# Patient Record
Sex: MALE | Race: WHITE | Employment: OTHER | URBAN - METROPOLITAN AREA
[De-identification: names, ages, dates, MRNs, and addresses within clinical notes are randomized per-mention and may not be internally consistent; named-entity substitution may affect disease eponyms.]

---

## 2017-08-25 PROBLEM — S32.000A CLOSED COMPRESSION FRACTURE OF LUMBAR VERTEBRA (HCC): Status: ACTIVE | Noted: 2017-08-25

## 2018-08-02 ENCOUNTER — HOSPITAL ENCOUNTER (OUTPATIENT)
Dept: GENERAL RADIOLOGY | Age: 78
Discharge: HOME | End: 2018-08-04
Payer: MEDICARE

## 2018-08-02 ENCOUNTER — HOSPITAL ENCOUNTER (OUTPATIENT)
Age: 78
Discharge: HOME | End: 2018-08-04
Payer: MEDICARE

## 2018-08-02 DIAGNOSIS — M79.642 PAIN IN LEFT HAND: ICD-10-CM

## 2018-08-02 PROCEDURE — 73130 X-RAY EXAM OF HAND: CPT

## 2019-10-20 ENCOUNTER — HOSPITAL ENCOUNTER (EMERGENCY)
Age: 79
Discharge: HOME | End: 2019-10-21
Attending: EMERGENCY MEDICINE
Payer: MEDICARE

## 2019-10-20 ENCOUNTER — APPOINTMENT (OUTPATIENT)
Dept: CT IMAGING | Age: 79
End: 2019-10-20
Payer: MEDICARE

## 2019-10-20 DIAGNOSIS — S06.6X0A SUBARACHNOID HEMORRHAGE FOLLOWING INJURY, NO LOSS OF CONSCIOUSNESS, INITIAL ENCOUNTER (HCC): ICD-10-CM

## 2019-10-20 DIAGNOSIS — S09.90XA INJURY OF HEAD, INITIAL ENCOUNTER: Primary | ICD-10-CM

## 2019-10-20 PROCEDURE — 70450 CT HEAD/BRAIN W/O DYE: CPT

## 2019-10-20 PROCEDURE — 72125 CT NECK SPINE W/O DYE: CPT

## 2019-10-20 PROCEDURE — 99284 EMERGENCY DEPT VISIT MOD MDM: CPT

## 2019-10-20 ASSESSMENT — PAIN DESCRIPTION - PAIN TYPE: TYPE: ACUTE PAIN

## 2019-10-20 ASSESSMENT — PAIN SCALES - GENERAL: PAINLEVEL_OUTOF10: 8

## 2019-10-20 ASSESSMENT — PAIN DESCRIPTION - LOCATION: LOCATION: HEAD

## 2019-10-20 ASSESSMENT — PAIN DESCRIPTION - DESCRIPTORS: DESCRIPTORS: ACHING

## 2019-10-21 ENCOUNTER — HOSPITAL ENCOUNTER (INPATIENT)
Age: 79
LOS: 3 days | Discharge: NURSING, REHAB OR LONG-TERM CARE | DRG: 083 | End: 2019-10-24
Attending: EMERGENCY MEDICINE | Admitting: STUDENT IN AN ORGANIZED HEALTH CARE EDUCATION/TRAINING PROGRAM
Payer: MEDICARE

## 2019-10-21 ENCOUNTER — APPOINTMENT (OUTPATIENT)
Dept: CT IMAGING | Age: 79
DRG: 083 | End: 2019-10-21
Payer: MEDICARE

## 2019-10-21 ENCOUNTER — HOSPITAL ENCOUNTER (OUTPATIENT)
Age: 79
Discharge: HOME | End: 2019-10-21
Payer: MEDICARE

## 2019-10-21 ENCOUNTER — APPOINTMENT (OUTPATIENT)
Dept: GENERAL RADIOLOGY | Age: 79
DRG: 083 | End: 2019-10-21
Payer: MEDICARE

## 2019-10-21 ENCOUNTER — APPOINTMENT (OUTPATIENT)
Dept: GENERAL RADIOLOGY | Age: 79
End: 2019-10-21
Payer: MEDICARE

## 2019-10-21 VITALS
HEART RATE: 64 BPM | DIASTOLIC BLOOD PRESSURE: 60 MMHG | TEMPERATURE: 98.5 F | HEIGHT: 68 IN | OXYGEN SATURATION: 97 % | SYSTOLIC BLOOD PRESSURE: 131 MMHG | RESPIRATION RATE: 18 BRPM | WEIGHT: 220 LBS | BODY MASS INDEX: 33.34 KG/M2

## 2019-10-21 DIAGNOSIS — I60.9 SUBARACHNOID HEMORRHAGE (HCC): ICD-10-CM

## 2019-10-21 DIAGNOSIS — S09.90XA INJURY OF HEAD, INITIAL ENCOUNTER: Primary | ICD-10-CM

## 2019-10-21 PROBLEM — T14.90XA TRAUMA: Status: ACTIVE | Noted: 2019-10-21

## 2019-10-21 LAB
ANION GAP SERPL CALCULATED.3IONS-SCNC: 12 MMOL/L (ref 7–16)
ANION GAP SERPL CALCULATED.3IONS-SCNC: 12 MMOL/L (ref 7–16)
APTT: 34.9 SEC (ref 24.5–35.1)
BASOPHILS ABSOLUTE: 0.05 E9/L (ref 0–0.2)
BASOPHILS RELATIVE PERCENT: 0.5 % (ref 0–2)
BUN BLDV-MCNC: 11 MG/DL (ref 8–23)
BUN BLDV-MCNC: 14 MG/DL (ref 8–23)
CALCIUM SERPL-MCNC: 8.7 MG/DL (ref 8.6–10.2)
CALCIUM SERPL-MCNC: 9.1 MG/DL (ref 8.6–10.2)
CHLORIDE BLD-SCNC: 100 MMOL/L (ref 98–107)
CHLORIDE BLD-SCNC: 100 MMOL/L (ref 98–107)
CO2: 27 MMOL/L (ref 22–29)
CO2: 27 MMOL/L (ref 22–29)
CREAT SERPL-MCNC: 0.6 MG/DL (ref 0.7–1.2)
CREAT SERPL-MCNC: 0.6 MG/DL (ref 0.7–1.2)
EOSINOPHILS ABSOLUTE: 0.41 E9/L (ref 0.05–0.5)
EOSINOPHILS RELATIVE PERCENT: 3.9 % (ref 0–6)
GFR AFRICAN AMERICAN: >60
GFR AFRICAN AMERICAN: >60
GFR NON-AFRICAN AMERICAN: >60 ML/MIN/1.73
GFR NON-AFRICAN AMERICAN: >60 ML/MIN/1.73
GLUCOSE BLD-MCNC: 122 MG/DL (ref 74–99)
GLUCOSE BLD-MCNC: 89 MG/DL (ref 74–99)
HCT VFR BLD CALC: 37.6 % (ref 37–54)
HEMOGLOBIN: 11.8 G/DL (ref 12.5–16.5)
IMMATURE GRANULOCYTES #: 0.03 E9/L
IMMATURE GRANULOCYTES %: 0.3 % (ref 0–5)
INR BLD: 0.9
LACTIC ACID: 0.8 MMOL/L (ref 0.5–2.2)
LYMPHOCYTES ABSOLUTE: 1.34 E9/L (ref 1.5–4)
LYMPHOCYTES RELATIVE PERCENT: 12.7 % (ref 20–42)
MAGNESIUM: 2.1 MG/DL (ref 1.6–2.6)
MCH RBC QN AUTO: 27.3 PG (ref 26–35)
MCHC RBC AUTO-ENTMCNC: 31.4 % (ref 32–34.5)
MCV RBC AUTO: 87 FL (ref 80–99.9)
MONOCYTES ABSOLUTE: 0.75 E9/L (ref 0.1–0.95)
MONOCYTES RELATIVE PERCENT: 7.1 % (ref 2–12)
NEUTROPHILS ABSOLUTE: 7.97 E9/L (ref 1.8–7.3)
NEUTROPHILS RELATIVE PERCENT: 75.5 % (ref 43–80)
PDW BLD-RTO: 14.8 FL (ref 11.5–15)
PLATELET # BLD: 252 E9/L (ref 130–450)
PMV BLD AUTO: 11 FL (ref 7–12)
POTASSIUM REFLEX MAGNESIUM: 3.7 MMOL/L (ref 3.5–5)
POTASSIUM SERPL-SCNC: 4.1 MMOL/L (ref 3.5–5)
PROTHROMBIN TIME: 10.9 SEC (ref 9.3–12.4)
RBC # BLD: 4.32 E12/L (ref 3.8–5.8)
SODIUM BLD-SCNC: 139 MMOL/L (ref 132–146)
SODIUM BLD-SCNC: 139 MMOL/L (ref 132–146)
TROPONIN: <0.01 NG/ML (ref 0–0.03)
WBC # BLD: 10.6 E9/L (ref 4.5–11.5)

## 2019-10-21 PROCEDURE — 97162 PT EVAL MOD COMPLEX 30 MIN: CPT

## 2019-10-21 PROCEDURE — 93005 ELECTROCARDIOGRAM TRACING: CPT | Performed by: NURSE PRACTITIONER

## 2019-10-21 PROCEDURE — 85610 PROTHROMBIN TIME: CPT

## 2019-10-21 PROCEDURE — 97530 THERAPEUTIC ACTIVITIES: CPT

## 2019-10-21 PROCEDURE — 36415 COLL VENOUS BLD VENIPUNCTURE: CPT

## 2019-10-21 PROCEDURE — 2580000003 HC RX 258: Performed by: STUDENT IN AN ORGANIZED HEALTH CARE EDUCATION/TRAINING PROGRAM

## 2019-10-21 PROCEDURE — A0425 GROUND MILEAGE: HCPCS

## 2019-10-21 PROCEDURE — A0426 ALS 1: HCPCS

## 2019-10-21 PROCEDURE — 84484 ASSAY OF TROPONIN QUANT: CPT

## 2019-10-21 PROCEDURE — 99223 1ST HOSP IP/OBS HIGH 75: CPT | Performed by: SURGERY

## 2019-10-21 PROCEDURE — 99221 1ST HOSP IP/OBS SF/LOW 40: CPT | Performed by: NEUROLOGICAL SURGERY

## 2019-10-21 PROCEDURE — 2140000000 HC CCU INTERMEDIATE R&B

## 2019-10-21 PROCEDURE — 85730 THROMBOPLASTIN TIME PARTIAL: CPT

## 2019-10-21 PROCEDURE — 80048 BASIC METABOLIC PNL TOTAL CA: CPT

## 2019-10-21 PROCEDURE — 72170 X-RAY EXAM OF PELVIS: CPT

## 2019-10-21 PROCEDURE — 6370000000 HC RX 637 (ALT 250 FOR IP): Performed by: STUDENT IN AN ORGANIZED HEALTH CARE EDUCATION/TRAINING PROGRAM

## 2019-10-21 PROCEDURE — 70450 CT HEAD/BRAIN W/O DYE: CPT

## 2019-10-21 PROCEDURE — 99285 EMERGENCY DEPT VISIT HI MDM: CPT

## 2019-10-21 PROCEDURE — 6360000002 HC RX W HCPCS

## 2019-10-21 PROCEDURE — 6370000000 HC RX 637 (ALT 250 FOR IP): Performed by: NURSE PRACTITIONER

## 2019-10-21 PROCEDURE — 97535 SELF CARE MNGMENT TRAINING: CPT

## 2019-10-21 PROCEDURE — 85025 COMPLETE CBC W/AUTO DIFF WBC: CPT

## 2019-10-21 PROCEDURE — 6370000000 HC RX 637 (ALT 250 FOR IP): Performed by: SURGERY

## 2019-10-21 PROCEDURE — 83605 ASSAY OF LACTIC ACID: CPT

## 2019-10-21 PROCEDURE — 83735 ASSAY OF MAGNESIUM: CPT

## 2019-10-21 PROCEDURE — 97166 OT EVAL MOD COMPLEX 45 MIN: CPT

## 2019-10-21 PROCEDURE — 71045 X-RAY EXAM CHEST 1 VIEW: CPT

## 2019-10-21 RX ORDER — LORATADINE 10 MG/1
10 CAPSULE, LIQUID FILLED ORAL DAILY PRN
COMMUNITY

## 2019-10-21 RX ORDER — TRAMADOL HYDROCHLORIDE 50 MG/1
50 TABLET ORAL ONCE
Status: COMPLETED | OUTPATIENT
Start: 2019-10-21 | End: 2019-10-21

## 2019-10-21 RX ORDER — SENNA AND DOCUSATE SODIUM 50; 8.6 MG/1; MG/1
1 TABLET, FILM COATED ORAL 2 TIMES DAILY
Status: DISCONTINUED | OUTPATIENT
Start: 2019-10-21 | End: 2019-10-24 | Stop reason: HOSPADM

## 2019-10-21 RX ORDER — HALOPERIDOL 5 MG/ML
INJECTION INTRAMUSCULAR
Status: COMPLETED
Start: 2019-10-21 | End: 2019-10-21

## 2019-10-21 RX ORDER — SODIUM CHLORIDE 0.9 % (FLUSH) 0.9 %
10 SYRINGE (ML) INJECTION PRN
Status: DISCONTINUED | OUTPATIENT
Start: 2019-10-21 | End: 2019-10-24 | Stop reason: HOSPADM

## 2019-10-21 RX ORDER — HYDROCODONE BITARTRATE AND ACETAMINOPHEN 5; 325 MG/1; MG/1
1 TABLET ORAL EVERY 4 HOURS PRN
Status: DISCONTINUED | OUTPATIENT
Start: 2019-10-21 | End: 2019-10-24 | Stop reason: HOSPADM

## 2019-10-21 RX ORDER — QUETIAPINE FUMARATE 300 MG/1
300 TABLET, FILM COATED ORAL DAILY
Status: ON HOLD | COMMUNITY
End: 2020-08-03

## 2019-10-21 RX ORDER — HYDROCODONE BITARTRATE AND ACETAMINOPHEN 5; 325 MG/1; MG/1
2 TABLET ORAL EVERY 4 HOURS PRN
Status: DISCONTINUED | OUTPATIENT
Start: 2019-10-21 | End: 2019-10-24 | Stop reason: HOSPADM

## 2019-10-21 RX ORDER — DONEPEZIL HYDROCHLORIDE 5 MG/1
5 TABLET, FILM COATED ORAL NIGHTLY
Status: DISCONTINUED | OUTPATIENT
Start: 2019-10-21 | End: 2019-10-24 | Stop reason: HOSPADM

## 2019-10-21 RX ORDER — ACETAMINOPHEN 500 MG
500 TABLET ORAL EVERY 6 HOURS SCHEDULED
Status: DISCONTINUED | OUTPATIENT
Start: 2019-10-21 | End: 2019-10-24 | Stop reason: HOSPADM

## 2019-10-21 RX ORDER — ONDANSETRON 2 MG/ML
4 INJECTION INTRAMUSCULAR; INTRAVENOUS EVERY 6 HOURS PRN
Status: DISCONTINUED | OUTPATIENT
Start: 2019-10-21 | End: 2019-10-24 | Stop reason: HOSPADM

## 2019-10-21 RX ORDER — QUETIAPINE FUMARATE 300 MG/1
300 TABLET, FILM COATED ORAL DAILY
Status: DISCONTINUED | OUTPATIENT
Start: 2019-10-21 | End: 2019-10-24 | Stop reason: HOSPADM

## 2019-10-21 RX ORDER — MEMANTINE HYDROCHLORIDE 10 MG/1
10 TABLET ORAL 2 TIMES DAILY
Status: DISCONTINUED | OUTPATIENT
Start: 2019-10-21 | End: 2019-10-24 | Stop reason: HOSPADM

## 2019-10-21 RX ORDER — BACITRACIN, NEOMYCIN, POLYMYXIN B 400; 3.5; 5 [USP'U]/G; MG/G; [USP'U]/G
OINTMENT TOPICAL DAILY
Status: DISCONTINUED | OUTPATIENT
Start: 2019-10-21 | End: 2019-10-24 | Stop reason: HOSPADM

## 2019-10-21 RX ORDER — LEVETIRACETAM 500 MG/1
500 TABLET ORAL 2 TIMES DAILY
Status: DISCONTINUED | OUTPATIENT
Start: 2019-10-21 | End: 2019-10-23 | Stop reason: SDUPTHER

## 2019-10-21 RX ORDER — DIAPER,BRIEF,INFANT-TODD,DISP
EACH MISCELLANEOUS ONCE
Status: COMPLETED | OUTPATIENT
Start: 2019-10-21 | End: 2019-10-21

## 2019-10-21 RX ORDER — SODIUM CHLORIDE 0.9 % (FLUSH) 0.9 %
10 SYRINGE (ML) INJECTION EVERY 12 HOURS SCHEDULED
Status: DISCONTINUED | OUTPATIENT
Start: 2019-10-21 | End: 2019-10-24 | Stop reason: HOSPADM

## 2019-10-21 RX ADMIN — BACITRACIN ZINC 1 G: 500 OINTMENT TOPICAL at 00:17

## 2019-10-21 RX ADMIN — ACETAMINOPHEN 500 MG: 500 TABLET ORAL at 16:38

## 2019-10-21 RX ADMIN — Medication 10 ML: at 21:36

## 2019-10-21 RX ADMIN — HALOPERIDOL LACTATE 5 MG: 5 INJECTION INTRAMUSCULAR at 07:09

## 2019-10-21 RX ADMIN — DONEPEZIL HYDROCHLORIDE 5 MG: 5 TABLET, FILM COATED ORAL at 21:33

## 2019-10-21 RX ADMIN — MEMANTINE HYDROCHLORIDE 10 MG: 10 TABLET, FILM COATED ORAL at 14:05

## 2019-10-21 RX ADMIN — MEMANTINE HYDROCHLORIDE 10 MG: 10 TABLET, FILM COATED ORAL at 21:32

## 2019-10-21 RX ADMIN — BACITRACIN ZINC, POLYMYXIN B SULFATE, NEOMYCIN SULFATE: 400; 5000; 3.5 OINTMENT TOPICAL at 14:05

## 2019-10-21 RX ADMIN — LEVETIRACETAM 500 MG: 500 TABLET ORAL at 21:32

## 2019-10-21 RX ADMIN — SENNOSIDES, DOCUSATE SODIUM 1 TABLET: 50; 8.6 TABLET, FILM COATED ORAL at 21:32

## 2019-10-21 RX ADMIN — TRAMADOL HYDROCHLORIDE 50 MG: 50 TABLET ORAL at 00:17

## 2019-10-21 RX ADMIN — LEVETIRACETAM 500 MG: 500 TABLET ORAL at 05:09

## 2019-10-21 RX ADMIN — QUETIAPINE FUMARATE 300 MG: 300 TABLET ORAL at 21:33

## 2019-10-21 ASSESSMENT — PAIN SCALES - GENERAL
PAINLEVEL_OUTOF10: 3
PAINLEVEL_OUTOF10: 0

## 2019-10-22 LAB
EKG ATRIAL RATE: 69 BPM
EKG P AXIS: 115 DEGREES
EKG P-R INTERVAL: 152 MS
EKG Q-T INTERVAL: 464 MS
EKG QRS DURATION: 128 MS
EKG QTC CALCULATION (BAZETT): 497 MS
EKG R AXIS: 18 DEGREES
EKG T AXIS: 30 DEGREES
EKG VENTRICULAR RATE: 69 BPM

## 2019-10-22 PROCEDURE — 2140000000 HC CCU INTERMEDIATE R&B

## 2019-10-22 PROCEDURE — 93010 ELECTROCARDIOGRAM REPORT: CPT | Performed by: INTERNAL MEDICINE

## 2019-10-22 PROCEDURE — 6370000000 HC RX 637 (ALT 250 FOR IP): Performed by: SURGERY

## 2019-10-22 PROCEDURE — 6370000000 HC RX 637 (ALT 250 FOR IP): Performed by: STUDENT IN AN ORGANIZED HEALTH CARE EDUCATION/TRAINING PROGRAM

## 2019-10-22 PROCEDURE — 99221 1ST HOSP IP/OBS SF/LOW 40: CPT | Performed by: EMERGENCY MEDICINE

## 2019-10-22 PROCEDURE — 2580000003 HC RX 258: Performed by: STUDENT IN AN ORGANIZED HEALTH CARE EDUCATION/TRAINING PROGRAM

## 2019-10-22 PROCEDURE — 99232 SBSQ HOSP IP/OBS MODERATE 35: CPT | Performed by: NEUROLOGICAL SURGERY

## 2019-10-22 PROCEDURE — 99232 SBSQ HOSP IP/OBS MODERATE 35: CPT | Performed by: SURGERY

## 2019-10-22 RX ADMIN — SENNOSIDES, DOCUSATE SODIUM 1 TABLET: 50; 8.6 TABLET, FILM COATED ORAL at 20:17

## 2019-10-22 RX ADMIN — LEVETIRACETAM 500 MG: 500 TABLET ORAL at 20:17

## 2019-10-22 RX ADMIN — DONEPEZIL HYDROCHLORIDE 5 MG: 5 TABLET, FILM COATED ORAL at 20:17

## 2019-10-22 RX ADMIN — Medication 10 ML: at 20:25

## 2019-10-22 RX ADMIN — MEMANTINE HYDROCHLORIDE 10 MG: 10 TABLET, FILM COATED ORAL at 20:17

## 2019-10-22 ASSESSMENT — PAIN SCALES - GENERAL
PAINLEVEL_OUTOF10: 0

## 2019-10-22 ASSESSMENT — PAIN SCALES - WONG BAKER
WONGBAKER_NUMERICALRESPONSE: 0

## 2019-10-23 PROCEDURE — 6370000000 HC RX 637 (ALT 250 FOR IP): Performed by: SURGERY

## 2019-10-23 PROCEDURE — 6370000000 HC RX 637 (ALT 250 FOR IP): Performed by: STUDENT IN AN ORGANIZED HEALTH CARE EDUCATION/TRAINING PROGRAM

## 2019-10-23 PROCEDURE — 99232 SBSQ HOSP IP/OBS MODERATE 35: CPT | Performed by: NEUROLOGICAL SURGERY

## 2019-10-23 PROCEDURE — 1200000000 HC SEMI PRIVATE

## 2019-10-23 PROCEDURE — 99232 SBSQ HOSP IP/OBS MODERATE 35: CPT | Performed by: SURGERY

## 2019-10-23 RX ORDER — LEVETIRACETAM 100 MG/ML
500 SOLUTION ORAL 2 TIMES DAILY
Status: DISCONTINUED | OUTPATIENT
Start: 2019-10-23 | End: 2019-10-24 | Stop reason: HOSPADM

## 2019-10-23 RX ADMIN — BACITRACIN ZINC, POLYMYXIN B SULFATE, NEOMYCIN SULFATE: 400; 5000; 3.5 OINTMENT TOPICAL at 09:18

## 2019-10-23 RX ADMIN — LEVETIRACETAM 500 MG: 100 SOLUTION ORAL at 21:08

## 2019-10-23 RX ADMIN — LEVETIRACETAM 500 MG: 500 TABLET ORAL at 09:18

## 2019-10-23 ASSESSMENT — PAIN SCALES - GENERAL: PAINLEVEL_OUTOF10: 0

## 2019-10-23 ASSESSMENT — PAIN SCALES - WONG BAKER
WONGBAKER_NUMERICALRESPONSE: 0
WONGBAKER_NUMERICALRESPONSE: 0

## 2019-10-24 VITALS
BODY MASS INDEX: 33.34 KG/M2 | HEIGHT: 68 IN | DIASTOLIC BLOOD PRESSURE: 62 MMHG | TEMPERATURE: 98.3 F | HEART RATE: 80 BPM | RESPIRATION RATE: 18 BRPM | OXYGEN SATURATION: 97 % | SYSTOLIC BLOOD PRESSURE: 124 MMHG | WEIGHT: 220 LBS

## 2019-10-24 PROCEDURE — 6370000000 HC RX 637 (ALT 250 FOR IP): Performed by: SURGERY

## 2019-10-24 PROCEDURE — APPSS60 APP SPLIT SHARED TIME 46-60 MINUTES: Performed by: NURSE PRACTITIONER

## 2019-10-24 PROCEDURE — 99238 HOSP IP/OBS DSCHRG MGMT 30/<: CPT | Performed by: SURGERY

## 2019-10-24 PROCEDURE — 2580000003 HC RX 258: Performed by: STUDENT IN AN ORGANIZED HEALTH CARE EDUCATION/TRAINING PROGRAM

## 2019-10-24 PROCEDURE — 6370000000 HC RX 637 (ALT 250 FOR IP): Performed by: STUDENT IN AN ORGANIZED HEALTH CARE EDUCATION/TRAINING PROGRAM

## 2019-10-24 RX ORDER — LEVETIRACETAM 100 MG/ML
500 SOLUTION ORAL 2 TIMES DAILY
Qty: 50 ML | Refills: 0 | Status: SHIPPED | OUTPATIENT
Start: 2019-10-24 | End: 2019-10-29

## 2019-10-24 RX ORDER — LEVETIRACETAM 100 MG/ML
500 SOLUTION ORAL 2 TIMES DAILY
Qty: 50 ML | Refills: 0 | Status: SHIPPED | OUTPATIENT
Start: 2019-10-24 | End: 2019-10-24

## 2019-10-24 RX ADMIN — QUETIAPINE FUMARATE 300 MG: 300 TABLET ORAL at 09:57

## 2019-10-24 RX ADMIN — MEMANTINE HYDROCHLORIDE 10 MG: 10 TABLET, FILM COATED ORAL at 09:58

## 2019-10-24 RX ADMIN — ACETAMINOPHEN 500 MG: 500 TABLET ORAL at 11:30

## 2019-10-24 RX ADMIN — LEVETIRACETAM 500 MG: 100 SOLUTION ORAL at 09:58

## 2019-10-24 RX ADMIN — SENNOSIDES, DOCUSATE SODIUM 1 TABLET: 50; 8.6 TABLET, FILM COATED ORAL at 09:58

## 2019-10-24 RX ADMIN — BACITRACIN ZINC, POLYMYXIN B SULFATE, NEOMYCIN SULFATE: 400; 5000; 3.5 OINTMENT TOPICAL at 09:59

## 2019-10-24 ASSESSMENT — PAIN SCALES - WONG BAKER
WONGBAKER_NUMERICALRESPONSE: 0
WONGBAKER_NUMERICALRESPONSE: 0

## 2019-11-05 DIAGNOSIS — I60.9 SUBARACHNOID HEMORRHAGE (HCC): Primary | ICD-10-CM

## 2020-08-03 ENCOUNTER — HOSPITAL ENCOUNTER (INPATIENT)
Age: 80
LOS: 1 days | Discharge: NURSING, REHAB OR LONG-TERM CARE | DRG: 194 | End: 2020-08-04
Attending: EMERGENCY MEDICINE | Admitting: INTERNAL MEDICINE
Payer: MEDICARE

## 2020-08-03 ENCOUNTER — APPOINTMENT (OUTPATIENT)
Dept: GENERAL RADIOLOGY | Age: 80
DRG: 194 | End: 2020-08-03
Payer: MEDICARE

## 2020-08-03 PROBLEM — J18.9 PNEUMONIA: Status: ACTIVE | Noted: 2020-08-03

## 2020-08-03 LAB
ALBUMIN SERPL-MCNC: 3.9 G/DL (ref 3.5–5.2)
ALP BLD-CCNC: 64 U/L (ref 40–129)
ALT SERPL-CCNC: 21 U/L (ref 0–40)
ANION GAP SERPL CALCULATED.3IONS-SCNC: 16 MMOL/L (ref 7–16)
AST SERPL-CCNC: 39 U/L (ref 0–39)
BACTERIA: ABNORMAL /HPF
BASOPHILS ABSOLUTE: 0.01 E9/L (ref 0–0.2)
BASOPHILS RELATIVE PERCENT: 0.2 % (ref 0–2)
BILIRUB SERPL-MCNC: 0.5 MG/DL (ref 0–1.2)
BILIRUBIN DIRECT: 0.2 MG/DL (ref 0–0.3)
BILIRUBIN URINE: ABNORMAL
BILIRUBIN, INDIRECT: 0.3 MG/DL (ref 0–1)
BLOOD, URINE: ABNORMAL
BUN BLDV-MCNC: 17 MG/DL (ref 8–23)
CALCIUM SERPL-MCNC: 8.9 MG/DL (ref 8.6–10.2)
CHLORIDE BLD-SCNC: 95 MMOL/L (ref 98–107)
CHP ED QC CHECK: NORMAL
CLARITY: CLEAR
CO2: 26 MMOL/L (ref 22–29)
COLOR: ABNORMAL
CREAT SERPL-MCNC: 0.5 MG/DL (ref 0.7–1.2)
EOSINOPHILS ABSOLUTE: 0.01 E9/L (ref 0.05–0.5)
EOSINOPHILS RELATIVE PERCENT: 0.2 % (ref 0–6)
GFR AFRICAN AMERICAN: >60
GFR NON-AFRICAN AMERICAN: >60 ML/MIN/1.73
GLUCOSE BLD-MCNC: 100 MG/DL (ref 74–99)
GLUCOSE BLD-MCNC: 108 MG/DL
GLUCOSE URINE: NEGATIVE MG/DL
HCT VFR BLD CALC: 47.5 % (ref 37–54)
HEMOGLOBIN: 15.7 G/DL (ref 12.5–16.5)
IMMATURE GRANULOCYTES #: 0.02 E9/L
IMMATURE GRANULOCYTES %: 0.4 % (ref 0–5)
KETONES, URINE: 40 MG/DL
LACTIC ACID, SEPSIS: 3.4 MMOL/L (ref 0.5–1.9)
LEUKOCYTE ESTERASE, URINE: NEGATIVE
LYMPHOCYTES ABSOLUTE: 0.85 E9/L (ref 1.5–4)
LYMPHOCYTES RELATIVE PERCENT: 15.9 % (ref 20–42)
MCH RBC QN AUTO: 31.3 PG (ref 26–35)
MCHC RBC AUTO-ENTMCNC: 33.1 % (ref 32–34.5)
MCV RBC AUTO: 94.6 FL (ref 80–99.9)
METER GLUCOSE: 107 MG/DL (ref 74–99)
MONOCYTES ABSOLUTE: 0.62 E9/L (ref 0.1–0.95)
MONOCYTES RELATIVE PERCENT: 11.6 % (ref 2–12)
NEUTROPHILS ABSOLUTE: 3.82 E9/L (ref 1.8–7.3)
NEUTROPHILS RELATIVE PERCENT: 71.7 % (ref 43–80)
NITRITE, URINE: NEGATIVE
PDW BLD-RTO: 13.9 FL (ref 11.5–15)
PH UA: 7 (ref 5–9)
PLATELET # BLD: 209 E9/L (ref 130–450)
PMV BLD AUTO: 10.7 FL (ref 7–12)
POTASSIUM REFLEX MAGNESIUM: 3.8 MMOL/L (ref 3.5–5)
PRO-BNP: 355 PG/ML (ref 0–450)
PROTEIN UA: ABNORMAL MG/DL
RBC # BLD: 5.02 E12/L (ref 3.8–5.8)
RBC UA: ABNORMAL /HPF (ref 0–2)
SARS-COV-2, NAAT: NOT DETECTED
SODIUM BLD-SCNC: 137 MMOL/L (ref 132–146)
SPECIFIC GRAVITY UA: 1.01 (ref 1–1.03)
TOTAL PROTEIN: 7.3 G/DL (ref 6.4–8.3)
UROBILINOGEN, URINE: 1 E.U./DL
WBC # BLD: 5.3 E9/L (ref 4.5–11.5)
WBC UA: ABNORMAL /HPF (ref 0–5)

## 2020-08-03 PROCEDURE — 87040 BLOOD CULTURE FOR BACTERIA: CPT

## 2020-08-03 PROCEDURE — 99285 EMERGENCY DEPT VISIT HI MDM: CPT

## 2020-08-03 PROCEDURE — 85025 COMPLETE CBC W/AUTO DIFF WBC: CPT

## 2020-08-03 PROCEDURE — 80048 BASIC METABOLIC PNL TOTAL CA: CPT

## 2020-08-03 PROCEDURE — 85730 THROMBOPLASTIN TIME PARTIAL: CPT

## 2020-08-03 PROCEDURE — 83880 ASSAY OF NATRIURETIC PEPTIDE: CPT

## 2020-08-03 PROCEDURE — 6360000002 HC RX W HCPCS: Performed by: EMERGENCY MEDICINE

## 2020-08-03 PROCEDURE — 84145 PROCALCITONIN (PCT): CPT

## 2020-08-03 PROCEDURE — 85378 FIBRIN DEGRADE SEMIQUANT: CPT

## 2020-08-03 PROCEDURE — U0002 COVID-19 LAB TEST NON-CDC: HCPCS

## 2020-08-03 PROCEDURE — 6370000000 HC RX 637 (ALT 250 FOR IP): Performed by: INTERNAL MEDICINE

## 2020-08-03 PROCEDURE — 71045 X-RAY EXAM CHEST 1 VIEW: CPT

## 2020-08-03 PROCEDURE — 93005 ELECTROCARDIOGRAM TRACING: CPT | Performed by: EMERGENCY MEDICINE

## 2020-08-03 PROCEDURE — 96374 THER/PROPH/DIAG INJ IV PUSH: CPT

## 2020-08-03 PROCEDURE — 2060000000 HC ICU INTERMEDIATE R&B

## 2020-08-03 PROCEDURE — 85610 PROTHROMBIN TIME: CPT

## 2020-08-03 PROCEDURE — 36415 COLL VENOUS BLD VENIPUNCTURE: CPT

## 2020-08-03 PROCEDURE — 82728 ASSAY OF FERRITIN: CPT

## 2020-08-03 PROCEDURE — 80053 COMPREHEN METABOLIC PANEL: CPT

## 2020-08-03 PROCEDURE — 82962 GLUCOSE BLOOD TEST: CPT

## 2020-08-03 PROCEDURE — 6360000002 HC RX W HCPCS: Performed by: INTERNAL MEDICINE

## 2020-08-03 PROCEDURE — 2580000003 HC RX 258: Performed by: EMERGENCY MEDICINE

## 2020-08-03 PROCEDURE — 96375 TX/PRO/DX INJ NEW DRUG ADDON: CPT

## 2020-08-03 PROCEDURE — 2500000003 HC RX 250 WO HCPCS: Performed by: EMERGENCY MEDICINE

## 2020-08-03 PROCEDURE — 80076 HEPATIC FUNCTION PANEL: CPT

## 2020-08-03 PROCEDURE — 83605 ASSAY OF LACTIC ACID: CPT

## 2020-08-03 PROCEDURE — 87088 URINE BACTERIA CULTURE: CPT

## 2020-08-03 PROCEDURE — 83615 LACTATE (LD) (LDH) ENZYME: CPT

## 2020-08-03 PROCEDURE — 81001 URINALYSIS AUTO W/SCOPE: CPT

## 2020-08-03 PROCEDURE — 85384 FIBRINOGEN ACTIVITY: CPT

## 2020-08-03 PROCEDURE — 2580000003 HC RX 258: Performed by: INTERNAL MEDICINE

## 2020-08-03 RX ORDER — HYDROXYZINE PAMOATE 25 MG/1
25 CAPSULE ORAL EVERY 8 HOURS PRN
COMMUNITY

## 2020-08-03 RX ORDER — GUAIFENESIN AND DEXTROMETHORPHAN HYDROBROMIDE 100; 10 MG/5ML; MG/5ML
5 SOLUTION ORAL EVERY 4 HOURS PRN
COMMUNITY

## 2020-08-03 RX ORDER — SODIUM CHLORIDE 0.9 % (FLUSH) 0.9 %
10 SYRINGE (ML) INJECTION PRN
Status: DISCONTINUED | OUTPATIENT
Start: 2020-08-03 | End: 2020-08-04 | Stop reason: HOSPADM

## 2020-08-03 RX ORDER — LOPERAMIDE HYDROCHLORIDE 2 MG/1
2 CAPSULE ORAL 4 TIMES DAILY PRN
COMMUNITY

## 2020-08-03 RX ORDER — ACETAMINOPHEN 650 MG/1
650 SUPPOSITORY RECTAL EVERY 6 HOURS PRN
Status: DISCONTINUED | OUTPATIENT
Start: 2020-08-03 | End: 2020-08-04 | Stop reason: HOSPADM

## 2020-08-03 RX ORDER — RIVASTIGMINE TARTRATE 3 MG/1
3 CAPSULE ORAL 2 TIMES DAILY
COMMUNITY

## 2020-08-03 RX ORDER — MEMANTINE HYDROCHLORIDE 10 MG/1
10 TABLET ORAL 2 TIMES DAILY
Status: DISCONTINUED | OUTPATIENT
Start: 2020-08-03 | End: 2020-08-04 | Stop reason: HOSPADM

## 2020-08-03 RX ORDER — SODIUM CHLORIDE 9 MG/ML
INJECTION, SOLUTION INTRAVENOUS CONTINUOUS
Status: DISCONTINUED | OUTPATIENT
Start: 2020-08-03 | End: 2020-08-04

## 2020-08-03 RX ORDER — SODIUM CHLORIDE 0.9 % (FLUSH) 0.9 %
10 SYRINGE (ML) INJECTION PRN
Status: DISCONTINUED | OUTPATIENT
Start: 2020-08-03 | End: 2020-08-03 | Stop reason: SDUPTHER

## 2020-08-03 RX ORDER — ACETAMINOPHEN 325 MG/1
650 TABLET ORAL EVERY 6 HOURS PRN
Status: DISCONTINUED | OUTPATIENT
Start: 2020-08-03 | End: 2020-08-04 | Stop reason: HOSPADM

## 2020-08-03 RX ORDER — MAGNESIUM HYDROXIDE/ALUMINUM HYDROXICE/SIMETHICONE 120; 1200; 1200 MG/30ML; MG/30ML; MG/30ML
5 SUSPENSION ORAL 4 TIMES DAILY PRN
COMMUNITY

## 2020-08-03 RX ORDER — SODIUM CHLORIDE 0.9 % (FLUSH) 0.9 %
10 SYRINGE (ML) INJECTION EVERY 12 HOURS SCHEDULED
Status: DISCONTINUED | OUTPATIENT
Start: 2020-08-03 | End: 2020-08-03 | Stop reason: SDUPTHER

## 2020-08-03 RX ORDER — PROMETHAZINE HYDROCHLORIDE 25 MG/1
12.5 TABLET ORAL EVERY 6 HOURS PRN
Status: DISCONTINUED | OUTPATIENT
Start: 2020-08-03 | End: 2020-08-04 | Stop reason: HOSPADM

## 2020-08-03 RX ORDER — QUETIAPINE FUMARATE 100 MG/1
300 TABLET, FILM COATED ORAL DAILY
Status: DISCONTINUED | OUTPATIENT
Start: 2020-08-03 | End: 2020-08-04 | Stop reason: HOSPADM

## 2020-08-03 RX ORDER — SODIUM CHLORIDE 0.9 % (FLUSH) 0.9 %
10 SYRINGE (ML) INJECTION EVERY 12 HOURS SCHEDULED
Status: DISCONTINUED | OUTPATIENT
Start: 2020-08-03 | End: 2020-08-04 | Stop reason: HOSPADM

## 2020-08-03 RX ORDER — ONDANSETRON 2 MG/ML
4 INJECTION INTRAMUSCULAR; INTRAVENOUS EVERY 6 HOURS PRN
Status: DISCONTINUED | OUTPATIENT
Start: 2020-08-03 | End: 2020-08-04 | Stop reason: HOSPADM

## 2020-08-03 RX ORDER — CETIRIZINE HYDROCHLORIDE 10 MG/1
5 TABLET ORAL DAILY
Status: DISCONTINUED | OUTPATIENT
Start: 2020-08-03 | End: 2020-08-04 | Stop reason: HOSPADM

## 2020-08-03 RX ORDER — DONEPEZIL HYDROCHLORIDE 5 MG/1
5 TABLET, FILM COATED ORAL NIGHTLY
Status: DISCONTINUED | OUTPATIENT
Start: 2020-08-03 | End: 2020-08-04 | Stop reason: HOSPADM

## 2020-08-03 RX ORDER — 0.9 % SODIUM CHLORIDE 0.9 %
1000 INTRAVENOUS SOLUTION INTRAVENOUS ONCE
Status: COMPLETED | OUTPATIENT
Start: 2020-08-03 | End: 2020-08-03

## 2020-08-03 RX ORDER — LORAZEPAM 2 MG/ML
0.5 INJECTION INTRAMUSCULAR ONCE
Status: COMPLETED | OUTPATIENT
Start: 2020-08-03 | End: 2020-08-03

## 2020-08-03 RX ORDER — IPRATROPIUM BROMIDE AND ALBUTEROL SULFATE 2.5; .5 MG/3ML; MG/3ML
1 SOLUTION RESPIRATORY (INHALATION)
Status: DISCONTINUED | OUTPATIENT
Start: 2020-08-04 | End: 2020-08-04 | Stop reason: HOSPADM

## 2020-08-03 RX ADMIN — LORAZEPAM 0.5 MG: 2 INJECTION INTRAMUSCULAR; INTRAVENOUS at 17:55

## 2020-08-03 RX ADMIN — SODIUM CHLORIDE: 9 INJECTION, SOLUTION INTRAVENOUS at 22:24

## 2020-08-03 RX ADMIN — DOXYCYCLINE 100 MG: 100 INJECTION, POWDER, LYOPHILIZED, FOR SOLUTION INTRAVENOUS at 19:26

## 2020-08-03 RX ADMIN — SODIUM CHLORIDE 1000 ML: 9 INJECTION, SOLUTION INTRAVENOUS at 14:54

## 2020-08-03 RX ADMIN — MEMANTINE HYDROCHLORIDE 10 MG: 10 TABLET, FILM COATED ORAL at 22:24

## 2020-08-03 RX ADMIN — CEFTRIAXONE SODIUM 1 G: 1 INJECTION, POWDER, FOR SOLUTION INTRAMUSCULAR; INTRAVENOUS at 17:50

## 2020-08-03 RX ADMIN — CETIRIZINE HYDROCHLORIDE 5 MG: 10 TABLET, FILM COATED ORAL at 22:24

## 2020-08-03 RX ADMIN — SODIUM CHLORIDE, PRESERVATIVE FREE 1 G: 5 INJECTION INTRAVENOUS at 22:23

## 2020-08-03 RX ADMIN — ENOXAPARIN SODIUM 40 MG: 40 INJECTION SUBCUTANEOUS at 22:24

## 2020-08-03 ASSESSMENT — PAIN SCALES - GENERAL: PAINLEVEL_OUTOF10: 0

## 2020-08-03 NOTE — ED NOTES
Patient is non verbal, confused, bites and kicks. Skin warm to touch. Does not follow command.       Yasir Redd RN  08/03/20 9750

## 2020-08-04 VITALS
HEIGHT: 69 IN | RESPIRATION RATE: 18 BRPM | HEART RATE: 81 BPM | TEMPERATURE: 98.6 F | BODY MASS INDEX: 24.62 KG/M2 | OXYGEN SATURATION: 99 % | WEIGHT: 166.2 LBS | DIASTOLIC BLOOD PRESSURE: 33 MMHG | SYSTOLIC BLOOD PRESSURE: 102 MMHG

## 2020-08-04 PROBLEM — G40.909 SEIZURE DISORDER (HCC): Chronic | Status: ACTIVE | Noted: 2020-08-04

## 2020-08-04 PROBLEM — T14.90XA TRAUMA: Status: RESOLVED | Noted: 2019-10-21 | Resolved: 2020-08-04

## 2020-08-04 PROBLEM — E78.5 HYPERLIPIDEMIA LDL GOAL <100: Chronic | Status: ACTIVE | Noted: 2020-08-04

## 2020-08-04 PROBLEM — I50.22 CHRONIC SYSTOLIC CONGESTIVE HEART FAILURE (HCC): Chronic | Status: ACTIVE | Noted: 2020-08-04

## 2020-08-04 PROBLEM — F03.90 DEMENTIA (HCC): Chronic | Status: ACTIVE | Noted: 2020-08-04

## 2020-08-04 PROBLEM — S32.000A CLOSED COMPRESSION FRACTURE OF LUMBAR VERTEBRA (HCC): Status: RESOLVED | Noted: 2017-08-25 | Resolved: 2020-08-04

## 2020-08-04 LAB
ALBUMIN SERPL-MCNC: 3.4 G/DL (ref 3.5–5.2)
ALP BLD-CCNC: 56 U/L (ref 40–129)
ALT SERPL-CCNC: 18 U/L (ref 0–40)
ANION GAP SERPL CALCULATED.3IONS-SCNC: 16 MMOL/L (ref 7–16)
APTT: 37.4 SEC (ref 24.5–35.1)
AST SERPL-CCNC: 39 U/L (ref 0–39)
BASOPHILS ABSOLUTE: 0.02 E9/L (ref 0–0.2)
BASOPHILS RELATIVE PERCENT: 0.4 % (ref 0–2)
BILIRUB SERPL-MCNC: 0.3 MG/DL (ref 0–1.2)
BUN BLDV-MCNC: 13 MG/DL (ref 8–23)
CALCIUM SERPL-MCNC: 8.4 MG/DL (ref 8.6–10.2)
CHLORIDE BLD-SCNC: 98 MMOL/L (ref 98–107)
CO2: 25 MMOL/L (ref 22–29)
CREAT SERPL-MCNC: 0.4 MG/DL (ref 0.7–1.2)
D DIMER: 285 NG/ML DDU
EKG ATRIAL RATE: 107 BPM
EKG P AXIS: 27 DEGREES
EKG P-R INTERVAL: 118 MS
EKG Q-T INTERVAL: 364 MS
EKG QRS DURATION: 126 MS
EKG QTC CALCULATION (BAZETT): 483 MS
EKG R AXIS: 90 DEGREES
EKG T AXIS: 56 DEGREES
EKG VENTRICULAR RATE: 106 BPM
EOSINOPHILS ABSOLUTE: 0.02 E9/L (ref 0.05–0.5)
EOSINOPHILS RELATIVE PERCENT: 0.4 % (ref 0–6)
FIBRINOGEN: >700 MG/DL (ref 225–540)
GFR AFRICAN AMERICAN: >60
GFR NON-AFRICAN AMERICAN: >60 ML/MIN/1.73
GLUCOSE BLD-MCNC: 87 MG/DL (ref 74–99)
HCT VFR BLD CALC: 43.8 % (ref 37–54)
HEMOGLOBIN: 14.5 G/DL (ref 12.5–16.5)
IMMATURE GRANULOCYTES #: 0.02 E9/L
IMMATURE GRANULOCYTES %: 0.4 % (ref 0–5)
INR BLD: 1
LACTIC ACID: 1 MMOL/L (ref 0.5–2.2)
LYMPHOCYTES ABSOLUTE: 1.33 E9/L (ref 1.5–4)
LYMPHOCYTES RELATIVE PERCENT: 27.7 % (ref 20–42)
MCH RBC QN AUTO: 30.9 PG (ref 26–35)
MCHC RBC AUTO-ENTMCNC: 33.1 % (ref 32–34.5)
MCV RBC AUTO: 93.4 FL (ref 80–99.9)
MONOCYTES ABSOLUTE: 0.7 E9/L (ref 0.1–0.95)
MONOCYTES RELATIVE PERCENT: 14.6 % (ref 2–12)
NEUTROPHILS ABSOLUTE: 2.72 E9/L (ref 1.8–7.3)
NEUTROPHILS RELATIVE PERCENT: 56.5 % (ref 43–80)
PDW BLD-RTO: 13.8 FL (ref 11.5–15)
PLATELET # BLD: 194 E9/L (ref 130–450)
PMV BLD AUTO: 10.6 FL (ref 7–12)
POTASSIUM REFLEX MAGNESIUM: 4 MMOL/L (ref 3.5–5)
PROCALCITONIN: 0.06 NG/ML (ref 0–0.08)
PROCALCITONIN: 0.07 NG/ML (ref 0–0.08)
PROTHROMBIN TIME: 12.4 SEC (ref 9.3–12.4)
RBC # BLD: 4.69 E12/L (ref 3.8–5.8)
SODIUM BLD-SCNC: 139 MMOL/L (ref 132–146)
TOTAL PROTEIN: 6.5 G/DL (ref 6.4–8.3)
WBC # BLD: 4.8 E9/L (ref 4.5–11.5)

## 2020-08-04 PROCEDURE — 2580000003 HC RX 258: Performed by: INTERNAL MEDICINE

## 2020-08-04 PROCEDURE — 80053 COMPREHEN METABOLIC PANEL: CPT

## 2020-08-04 PROCEDURE — 2500000003 HC RX 250 WO HCPCS: Performed by: INTERNAL MEDICINE

## 2020-08-04 PROCEDURE — 83605 ASSAY OF LACTIC ACID: CPT

## 2020-08-04 PROCEDURE — 97165 OT EVAL LOW COMPLEX 30 MIN: CPT

## 2020-08-04 PROCEDURE — 92526 ORAL FUNCTION THERAPY: CPT | Performed by: SPEECH-LANGUAGE PATHOLOGIST

## 2020-08-04 PROCEDURE — 92610 EVALUATE SWALLOWING FUNCTION: CPT | Performed by: SPEECH-LANGUAGE PATHOLOGIST

## 2020-08-04 PROCEDURE — 6370000000 HC RX 637 (ALT 250 FOR IP): Performed by: INTERNAL MEDICINE

## 2020-08-04 PROCEDURE — 51798 US URINE CAPACITY MEASURE: CPT

## 2020-08-04 PROCEDURE — 97535 SELF CARE MNGMENT TRAINING: CPT

## 2020-08-04 PROCEDURE — 85378 FIBRIN DEGRADE SEMIQUANT: CPT

## 2020-08-04 PROCEDURE — 84145 PROCALCITONIN (PCT): CPT

## 2020-08-04 PROCEDURE — 85384 FIBRINOGEN ACTIVITY: CPT

## 2020-08-04 PROCEDURE — 93010 ELECTROCARDIOGRAM REPORT: CPT | Performed by: INTERNAL MEDICINE

## 2020-08-04 PROCEDURE — 36415 COLL VENOUS BLD VENIPUNCTURE: CPT

## 2020-08-04 PROCEDURE — 85610 PROTHROMBIN TIME: CPT

## 2020-08-04 PROCEDURE — 85025 COMPLETE CBC W/AUTO DIFF WBC: CPT

## 2020-08-04 PROCEDURE — 99221 1ST HOSP IP/OBS SF/LOW 40: CPT | Performed by: PHYSICIAN ASSISTANT

## 2020-08-04 PROCEDURE — 85730 THROMBOPLASTIN TIME PARTIAL: CPT

## 2020-08-04 PROCEDURE — 97161 PT EVAL LOW COMPLEX 20 MIN: CPT

## 2020-08-04 RX ORDER — SODIUM CHLORIDE 9 MG/ML
INJECTION, SOLUTION INTRAVENOUS CONTINUOUS
Status: DISCONTINUED | OUTPATIENT
Start: 2020-08-04 | End: 2020-08-04

## 2020-08-04 RX ORDER — LEVOFLOXACIN 500 MG/1
500 TABLET, FILM COATED ORAL DAILY
Status: DISCONTINUED | OUTPATIENT
Start: 2020-08-04 | End: 2020-08-04 | Stop reason: HOSPADM

## 2020-08-04 RX ORDER — LEVOFLOXACIN 500 MG/1
500 TABLET, FILM COATED ORAL DAILY
Qty: 5 TABLET | Refills: 0 | Status: SHIPPED | OUTPATIENT
Start: 2020-08-04 | End: 2020-08-09

## 2020-08-04 RX ADMIN — SODIUM CHLORIDE, PRESERVATIVE FREE 10 ML: 5 INJECTION INTRAVENOUS at 10:08

## 2020-08-04 RX ADMIN — DOXYCYCLINE 100 MG: 100 INJECTION, POWDER, LYOPHILIZED, FOR SOLUTION INTRAVENOUS at 06:05

## 2020-08-04 RX ADMIN — QUETIAPINE FUMARATE 300 MG: 100 TABLET ORAL at 10:08

## 2020-08-04 RX ADMIN — LEVOFLOXACIN 500 MG: 500 TABLET, FILM COATED ORAL at 11:09

## 2020-08-04 RX ADMIN — MEMANTINE HYDROCHLORIDE 10 MG: 10 TABLET, FILM COATED ORAL at 10:08

## 2020-08-04 RX ADMIN — CETIRIZINE HYDROCHLORIDE 5 MG: 10 TABLET, FILM COATED ORAL at 10:07

## 2020-08-04 ASSESSMENT — PAIN SCALES - GENERAL: PAINLEVEL_OUTOF10: 0

## 2020-08-04 NOTE — DISCHARGE INSTR - COC
Continuity of Care Form    Patient Name: Saad Morrow   :  1940  MRN:  [id number]    Admit date:  8/3/2020  Discharge date:  ***    Code Status Order: [facility]   Advance Directives:   [facility] Documentation     Date/Time Healthcare Directive Type of Healthcare Directive Copy in [address] Agent's Name Healthcare Agent's Phone Number    20  Yes, patient has an advance directive for healthcare treatment  Durable power of  for health care  Yes, copy in chart  Healthcare power of   Hira Weiss   [phone]          Admitting Physician:  Cassandra Lopez MD  PCP: Anamaria Tavarez MD    Discharging Nurse: [facility] Unit/Room#: 4757/4001-G  Discharging Unit Phone Number: ***    Emergency Contact:   Extended Emergency Contact Information  Primary Emergency Contact: Roym Ayala  Ida Phone: [phone]  Mobile Phone: [phone]  Relation: Niece/Nephew   needed?  No  Secondary Emergency Contact: Regina Shukla  Address: [address] [place] [address] Phone: [phone]  Relation: Child    Past Surgical History:  Past Surgical History:   Procedure Laterality Date    CHOLECYSTECTOMY      COLONOSCOPY  12    COLONOSCOPY  2016    ENDOSCOPY, COLON, DIAGNOSTIC      ENDOSCOPY, COLON, DIAGNOSTIC  2016    FIXATION KYPHOPLASTY  2017    L1    LEG SURGERY      due to effects from polio (multiple foot/ankle revisions)       Immunization History:   Immunization History   Administered Date(s) Administered    Tdap (Boostrix, Adacel) 10/04/2017       Active Problems:  Patient Active Problem List   Diagnosis Code    History of poliomyelitis Z86.12    Pneumonia J18.9    Hyperlipidemia LDL goal <100 E78.5    Chronic systolic congestive heart failure ([address] I50.22    Dementia ([address] F03.90    Seizure disorder ([facility] G40.909 Isolation/Infection:   Isolation          No Isolation        Patient Infection Status     Infection Onset Added Last Indicated Last Indicated By Review Planned Expiration Resolved Resolved By    None active    Resolved    COVID-19 Rule Out 08/03/20 08/03/20 08/03/20 COVID-19 (Ordered)   08/03/20 Rule-Out Test Resulted          Nurse Assessment:  Last Vital Signs: BP (!) 102/33 Comment: pt not sitting still   Pulse 81   Temp 98.6 °F (37 °C) (Axillary)   Resp 18   Ht 5' 9\" (1.753 m)   Wt 166 lb 3.2 oz (75.4 kg)   SpO2 99%   BMI 24.54 kg/m²     Last documented pain score (0-10 scale): Pain Level: 0  Last Weight:   Wt Readings from Last 1 Encounters:   08/04/20 166 lb 3.2 oz (75.4 kg)     Mental Status:  alert    IV Access:  - None    Nursing Mobility/ADLs:  Walking   Assisted  Transfer  Assisted  Bathing  Assisted  Dressing  Assisted  Toileting  Assisted  Feeding  Assisted  Med Admin  Assisted  Med Delivery   whole and prefers mixed with applesauce    Wound Care Documentation and Therapy:        Elimination:  Continence:   · Bowel: No  · Bladder: Yes  Urinary Catheter: None   Colostomy/Ileostomy/Ileal Conduit: No       Date of Last BM: 8/4/20  No intake or output data in the 24 hours ending 08/04/20 1032  No intake/output data recorded. Safety Concerns:     Sundowners Sundrome    Impairments/Disabilities:      Language Barrier - Will not talk    Nutrition Therapy:  Current Nutrition Therapy:   - Oral Diet:  Dental Soft    Routes of Feeding: Oral  Liquids: Thin Liquids  Daily Fluid Restriction: no  Last Modified Barium Swallow with Video (Video Swallowing Test): not done    Treatments at the Time of Hospital Discharge:   Respiratory Treatments: none  Oxygen Therapy:  is not on home oxygen therapy.   Ventilator:    - No ventilator support    Rehab Therapies: Physical Therapy and Occupational Therapy  Weight Bearing Status/Restrictions: No weight bearing restirctions  Other Medical Equipment (for information only, NOT a DME order):  {EQUIPMENT:368297739}  Other Treatments: ***    Patient's personal belongings (please select all that are sent with patient):  Glasses    RN SIGNATURE:  Electronically signed by Rocio Geronimo RN on 8/4/20 at 3:31 PM EDT    CASE MANAGEMENT/SOCIAL WORK SECTION    Inpatient Status Date: ***    Readmission Risk Assessment Score:  Readmission Risk              Risk of Unplanned Readmission:        15           Discharging to Facility/ Agency   · Name: [facility]  · [place] [address]  · Phone:[phone]  · Fax:[phone]    Dialysis Facility (if applicable)   · Name:  · Address:  · Dialysis Schedule:  · Phone:  · Fax:    / signature: Electronically signed by LORNA Santana on 8/4/2020 at 1:03 PM      PHYSICIAN SECTION    Prognosis: {Prognosis:4454266094}    Condition at Discharge: Lana Rebollar Patient Condition:[id number]}    Rehab Potential (if transferring to Rehab): {Prognosis:6401705925}    Recommended Labs or Other Treatments After Discharge: ***    Physician Certification: I certify the above information and transfer of Mireya Skelton  is necessary for the continuing treatment of the diagnosis listed and that he requires skilled snf for {LESS:673446535} 30 days. Update Admission H&P: [facility]}    PHYSICIAN SIGNATURE: Electronically signed by Eulalio Travis MD on 8/4/2020 at 10:32 AM    Follow up with dr fredis Guerrero in 1 week.

## 2020-08-04 NOTE — PROGRESS NOTES
assist with ww maneuvering. High fall risk due to poor safety awareness     Pt educated on fall risk,  Safe and proper technique with all mobility        Patient response to education:   Pt verbalized understanding Pt demonstrated skill Pt requires further education in this area   no no Yes        Comments:  Pt left  In bed after session, with call light in reach. Rehab potential is Good for reaching above PT goals. Pts/ family goals   1. None stated     Patient and or family understand(s) diagnosis, prognosis, and plan of care. -  no    PLAN  PT care will be provided in accordance with the objectives noted above. Whenever appropriate, clear delegation orders will be provided for nursing staff. Exercises and functional mobility practice will be used as well as appropriate assistive devices or modalities to obtain goals. Patient and family education will also be administered as needed. Frequency of treatments will be 2-5x/week x  7 days. Time in:  1010  Time out: 1027       Evaluation Time includes thorough review of current medical information, gathering information on past medical history/social history and prior level of function, completion of standardized testing/informal observation of tasks, assessment of data and education on plan of care and goals.     CPT codes:  [x] Low Complexity PT evaluation 28837  [] Moderate Complexity PT evaluation 11732  [] High Complexity PT evaluation 52386  [] PT Re-evaluation 56361  [] Gait training 88609  minutes  [] Therapeutic activities 82236  minutes  [] Therapeutic exercises 17586  minutes  [] Neuromuscular reeducation 30543  minutes       Elvira 18 number:  PT 2349

## 2020-08-04 NOTE — CONSULTS
DIAGNOSTIC      ENDOSCOPY, COLON, DIAGNOSTIC  2/1/2016    FIXATION KYPHOPLASTY  09/01/2017    L1    LEG SURGERY      due to effects from polio (multiple foot/ankle revisions)       Current Medications:     levoFLOXacin  500 mg Oral Daily    donepezil  5 mg Oral Nightly    cetirizine  5 mg Oral Daily    memantine  10 mg Oral BID    QUEtiapine  300 mg Oral Daily    sodium chloride flush  10 mL Intravenous 2 times per day    enoxaparin  40 mg Subcutaneous Daily    ipratropium-albuterol  1 ampule Inhalation Q4H WA       PRN Medications:  sodium chloride flush, acetaminophen **OR** acetaminophen, magnesium hydroxide, promethazine **OR** ondansetron    IV Medications:  Inpatient medications reviewed: yes  Home Medications reviewed: yes    Allergies   Allergen Reactions    Other Swelling     Crab legs/meat cause difficulty breathing. Didn't require epinephrine, treated with benadryl.       Shellfish-Derived Products Hives       Family History   Problem Relation Age of Onset   Dionne Solum Cancer Mother     Other Father         brain aneurysm    COPD Father     Other Brother         polio    Cancer Sister         lung cancer     Social history:   status: unknown  Marital status:   Living status: nursing home   Work history: retired  Tobacco use: yes, history  History of drug use: no  History of alcohol use: yes, occassional    Review of Systems:  unable to obtained due to current condition of patient    Objective:   PHYSICAL EXAM:   Vitals:    BP (!) 102/33 Comment: pt not sitting still   Pulse 81   Temp 98.6 °F (37 °C) (Axillary)   Resp 18   Ht 5' 9\" (1.753 m)   Wt 166 lb 3.2 oz (75.4 kg)   SpO2 99%   BMI 24.54 kg/m²   Gen: elderly, thin, awake, alert, not responding to questions   HEENT: normocephalic, atraumatic, PERRL, sclera anicteric, conjunctiva pink and moist  Neck: trachea midline, no JVD  Lungs: respirations easy and not labored  Heart: regular rate and rhythm  Abdomen: normoactive bowel sounds, soft, non-tender, non-distended  Musculoskeletal: no gross deformity, moving all extremities, muscle tone poor    Extremities: no clubbing, cyanosis or edema   Skin: Without rashes, lesions, bruising  Neuro: alert but not responding to questions, combative in care intermittently    I&O  No intake/output data recorded. Intake/Output Summary (Last 24 hours) at 8/4/2020 1541  Last data filed at 8/4/2020 1216  Gross per 24 hour   Intake 240 ml   Output --   Net 240 ml       Results/Verification of Data Review  Objective data reviewed labs, radiology. Recent Labs     08/03/20  1407 08/04/20  0312   WBC 5.3 4.8   RBC 5.02 4.69   HGB 15.7 14.5   HCT 47.5 43.8   MCV 94.6 93.4   MCH 31.3 30.9   MCHC 33.1 33.1   RDW 13.9 13.8    194   MPV 10.7 10.6       Recent Labs     08/03/20  1407 08/03/20  1521 08/04/20  0312     --  139   K 3.8  --  4.0   CL 95*  --  98   CO2 26  --  25   BUN 17  --  13   CREATININE 0.5*  --  0.4*   GLUCOSE 100* 108 87   CALCIUM 8.9  --  8.4*       No results for input(s): POCGLU in the last 72 hours.     CBC with Differential:    Lab Results   Component Value Date    WBC 4.8 08/04/2020    RBC 4.69 08/04/2020    HGB 14.5 08/04/2020    HCT 43.8 08/04/2020     08/04/2020    MCV 93.4 08/04/2020    MCH 30.9 08/04/2020    MCHC 33.1 08/04/2020    RDW 13.8 08/04/2020    SEGSPCT 81 03/30/2012    LYMPHOPCT 27.7 08/04/2020    MONOPCT 14.6 08/04/2020    BASOPCT 0.4 08/04/2020    MONOSABS 0.70 08/04/2020    LYMPHSABS 1.33 08/04/2020    EOSABS 0.02 08/04/2020    BASOSABS 0.02 08/04/2020     Hepatic Function Panel:    Lab Results   Component Value Date    ALKPHOS 56 08/04/2020    ALT 18 08/04/2020    AST 39 08/04/2020    PROT 6.5 08/04/2020    BILITOT 0.3 08/04/2020    BILIDIR 0.2 08/03/2020    IBILI 0.3 08/03/2020    LABALBU 3.4 08/04/2020    LABALBU 3.6 03/30/2012       Urine:  UA:  Lab Results   Component Value Date    COLORU DARK YELLOW 08/03/2020    PHUR 7.0 08/03/2020    WBCUA [F03.90] 08/04/2020    Seizure disorder ([facility] [G40.909] 08/04/2020    Pneumonia [J18.9] 08/03/2020    History of poliomyelitis [Z86.12] 08/09/2015     Pneumonia:  -Antibiotics per primary service    Alzheimer dementia:  -Continue Namenda and Seroquel    Palliative Care Encounter/Recommendations:  No family is at bedside. Patient's plan currently is to return to facility. He is a DNR-CC. He does have advanced directives available. Unable to obtain history or review of systems with patient due to dementia diagnosis. Family Meeting:  Participants:No family meeting held today awaiting return call from family  Family meeting was held to discuss:n/a     Controlled Substances Monitoring:      Discharge planning:   discharge to skilled facility    Discussed patient and the plan of care with the Palliative medicine IDT members. Referrals to: none today    Time/Communication: no family at bedside. Unable to obtain history from patient  Greater than 51% of time spent, total 0 minutes in counseling and coordination of care at the bedside regarding/over telephone see above. Oliver Gutierrez PA-C  Palliative Medicine    Thank you for allowing Palliative Medicine to participate in the care of Laine Carter. Note: This report was completed using computer[employer]Town voiced recognition software. Every effort has been made to ensure accuracy; however, inadvertent computerized transcription errors may be present.

## 2020-08-04 NOTE — PROGRESS NOTES
SPEECH LANGUAGE PATHOLOGY  DAILY PROGRESS NOTE        PATIENT NAME:  Hanh Young      :  1940          TODAY'S DATE:  2020 ROOM:  [facility]        SWALLOWING    Pt in bed, staff reports pt refused noon meal. Still at beside, SLP attempted to feed/assist pt with meal. Placed upright with staff assist. Pt accepted approx 50% of meal. Extended mastication, oral holding, disorganized bolus formation and oral residue noted this PM. Diet downgrade recommended and nursing notified. DYSPHAGIA DIAGNOSIS:  Pharyngeal dysphagia-overt s/s of aspiration observed with continuous sips of thin liquid per straw only.      Silent aspiration can not be r/o bedside. If silent aspiration is suspected, recommend video swallow eval to further assess. Due to pt cognitive status, silent aspiration is possible. DIET RECOMMENDATIONS:  Minced and moist with small sips thin liquids-NO STRAWS                 FEEDING RECOMMENDATIONS:                              Assistance level:  Stand by assistance is needed during all oral intake to cue for comp strats                             Compensatory strategies recommended: Small bites/sips and No straw           Oral- extended mastication, oral holding, disorganized bolus formation and oral residue noted this PM      Pharyngeal- No overt s/s of aspiration, no multiple swallows      Education- Pt educated on results and POC. Pt trained on compensatory strategies for safe swallow with fair-poor outcome. Questions answered. Will continue SP intervention as per previously established POC. Fidel President DOLORES COLON CCC/SLP [id number]  Speech Pathologist              CPT code(s) 84268  dysphagia tx  Total minutes :  30 minutes

## 2020-08-04 NOTE — PROGRESS NOTES
Occupational Therapy  OCCUPATIONAL THERAPY INITIAL EVALUATION      Date:2020  Patient Name: Amber Currie  MRN: [id number]  : 1940  Room: [address] [place] [address]    Referring Provider: Jaret Carrera MD    Evaluating OT: Paresh Capps OTR/L [id number]    AM-PAC Daily Activity Raw Score: 1524    Recommended Adaptive Equipment: TBD    Diagnosis: Pneumonia. Pertinent Medical History: dementia   Precautions:  falls     Home Living: Pt is a poor historian unable to provide PLOF. Per medical chart pt is from a memory care unit. Typically able to complete self feeding, grooming and toileting with cues from staff. Assist in LB dressing and bathing. Use of [employer] for mobility. Pain Level: no observable or reported pain    Cognition: A&O: . Pt is oriented to self only. Increased processing time. Occasionally verbalizes wants/needs. Able to make needs known regarding toileting. Can becomes distracted easily. Occasionally reaches impulsively and grabs for things requiring redirection cues.     Problem solving:  poor   Judgement/safety:  poor     Functional Assessment:   Initial Eval Status  Date: 20 Treatment session:  Short Term Goals  Treatment frequency: 2-5x/wk PRN x1-3 wks     Feeding Min A     Grooming Min A  Hand hygiene standing sink level  SBA   UB Dressing Min A  Donning/doffing hospital gown  SBA   LB Dressing Mod A  Donning/doffing brief  Min A    Bathing Mod A  Min A   Toileting Mod A   Use of grab bar for support in transfer  Assist in clinton care and brief management  SBA   Bed Mobility  Supine to sit: Mod A     Functional Transfers STS: Min A  SBA   Functional Mobility Min A with [employer]  Household distance  SBA during ADLs   Balance Sitting: fair plus    Standing: fair minus at [employer]     Activity Tolerance Fair minus  standing joseline x6-7 min with fair plus balance during self care tasks             Treatment: Patient educated on techniques for completion of ADL, safe functional transfers and functional mobility. Patient required cues for follow through with proper hand/foot placement, pacing, safety, attention, sequencing and technique in bed mobility, functional transfers, functional mobility, toileting, grooming, UB dressing and LB dressing in preparation for maximum independence in all self care tasks. Hand Dominance: Right []  Left []   Strength ROM Additional Info:    MAVIS  Does not follow formal MMT    Functionally 4/5 Appears WFL good  and fair FMC/dexterity noted during ADL tasks         Hearing: appears WFL  Vision: appears WFL  Sensation:  No c/o numbness or tingling   Tone: WFL                             Long Term Goal (1-3 wks): Pt will maximize functional performance in all self care tasks/functional transfers with good follow through of all trained techniques for safe transition to next level of care    Eval Complexity: Low    Assessment of current deficits   Functional mobility [x]  ADLs [x] Strength [x]  Cognition []  Functional transfers  [x] IADLs [x] Safety Awareness [x]  Endurance [x]  Fine Motor Coordination [] Balance [x] Vision/perception [] Sensation []   Gross Motor Coordination [] ROM [] Delirium []                  Motor Control []    Plan of Care:   ADL retraining [x]   Equipment needs [x]   Neuromuscular re-education [x] Energy Conservation Techniques [x]  Functional Transfer training [x] Patient and/or Family Education [x]  Functional Mobility training [x]  Environmental Modifications [x]  Cognitive re-training []   Compensatory techniques for ADLs [x]  Splinting Needs []   Positioning to improve overall function [x]   Therapeutic Activity [x]  Therapeutic Exercise  [x]  Visual/Perceptual: []    Delirium prevention/treatment  []   Other:  []    Rehab Potential: Good for established goals     Patient / Family Goal: Pt does not state goal.      Patient and/or family were instructed on functional diagnosis, prognosis/goals and OT plan of care.  Pt verbalized poor understanding. Upon arrival, patient supine in bed. At end of session, patient supine in bed with call light and phone within reach, all lines and tubes intact. Pt would benefit from continued skilled OT to increase safety and independence with completion of ADL/IADL tasks for functional independence and quality of life.  Bed/chair alarm: ON    Low Evaluation + 10 timed treatment minutes  Treatment Time In:1015   Treatment Time Out: 1025   Treatment Charges: Mins Units    ADL/Home Mgt 73570 8 1    Thera Activities 87530 2     Ther Ex 37029      Manual Therapy 33186      Neuro Re-ed 96473      Orthotic manage/training  16581      Non Billable Time      Total Timed Treatment 10 1          Evaluation time includes thorough review of current medical information, gathering information on past medical history/social history and prior level of function, completion of standardized testing/informal observation of tasks, assessment of data, and development of POC/Goals    Sampson Oconnor OTR/L  [id number]

## 2020-08-04 NOTE — PROGRESS NOTES
SPEECH/LANGUAGE PATHOLOGY  CLINICAL ASSESSMENT OF SWALLOWING FUNCTION    PATIENT NAME:  Mireya Skelton      :  1940      TODAY'S DATE:  2020  ROOM:  6057/7799-D    SUMMARY OF EVALUATION    Chart reviewed including most recent chest radiograph. Nursing reports pt can be combative. Pt cooperative during eval and allowed SLP to feed as needed. Pt alert, however did not respond to any of SLP questions and did not produce voicing to assess possible vocal change post swallow. DYSPHAGIA DIAGNOSIS:  Pharyngeal dysphagia-overt s/s of aspiration observed with continuous sips of thin liquid per straw only. Silent aspiration can not be r/o bedside. If silent aspiration is suspected, recommend video swallow eval to further assess. Due to pt cognitive status, silent aspiration is possible. DIET RECOMMENDATIONS:  Regular consistency solids with small sips thin liquids-NO STRAWS     FEEDING RECOMMENDATIONS:       Assistance level:  Stand by assistance is needed during all oral intake to cue for comp strats      Compensatory strategies recommended: Small bites/sips and No straw    THERAPY RECOMMENDATIONS:      Dysphagia therapy is recommended 3-5 times per week for LOS or when goals are met. Pt will complete laryngeal strength/ ROM therapeutic exercises to improve airway protection for the least restrictive PO diet with  maximal verbal prompts   Patient will participate in DPNS to address functional deficits identified during swallow evaluation  Ongoing meal endurance analysis to provide diet modification and compensatory strategy implementation to minimize risk of aspiration associated with PO intake                   PROCEDURE     Consistencies Administered During the Evaluation   Liquids: thin liquid   Solids:  pureed foods and solid foods      Method of Intake:   cup, straw, spoon  Self fed, Fed by clinician      Position:   Seated, upright                  RESULTS     Oral Stage:          The oral stage of swallowing was within functional limits; pt did demonstrate difficulty with straw initially due to cognitive status however able to use with verbal cues      Pharyngeal Stage:      No signs of aspiration were noted during this evaluation for solid, puree or thin liquids per cup however, silent aspiration cannot be ruled out at bedside. If silent aspiration is suspected, a Videofluoroscopic Study of Swallowing (MBS) is recommended and requires a physician order. Immediate wet cough was noted after presentation of thin liquid per straw                  The Speech Language Pathologist (SLP) completed education with the patient regarding results of evaluation. Explained that Speech Pathology intervention is warranted  at this time   Prognosis for improvements is fair     This plan will be re-evaluated and revised in 1 week  if warranted. Patient stated goals: Did not state,   Treatment goals discussed with Patient   The Patient did not demonstrate understanding of the diagnosis, prognosis and plan of care         INTERVENTION/EDUCATION    Pt educated on above results and plan of care. Pt trained on compensatory strategies for safe swallow with no response. Pt encouraged to engage in question/answer session. No questions asked. CPT code:  71122  bedside swallow eval, 95457 dysphagia therapy 15 mins      [x]The admitting diagnosis and active problem list, as listed below have been reviewed prior to initiation of this evaluation.      ADMITTING DIAGNOSIS: Pneumonia [J18.9]  Pneumonia [J18.9]     ACTIVE PROBLEM LIST:   Patient Active Problem List   Diagnosis    History of poliomyelitis    Pneumonia    Hyperlipidemia LDL goal <100    Chronic systolic congestive heart failure ([address]    Dementia (HCC)    Seizure disorder ([address]

## 2020-08-04 NOTE — PROGRESS NOTES
Dr. Marie Asp notified of patients code status as well as the patient continuing to take off his heart monitor and not allowing staff to get vitals.

## 2020-08-04 NOTE — H&P
[address] [facility]  Attending History and Physical      CHIEF COMPLAINT:  Shortness of breath      HISTORY OF PRESENT ILLNESS:      The patient is a 79 y.o. male patient of dr fredis Orlando who presents with complains of shortness of breath. Patient is awake and alert. He suffers dementia. He answers yes and no questions appropriately. He denies chest pain, shortness of breath, abdominal pain, nausea, vomiting, fevers, chills and diaphoresis. He ate breakfast this morning. Efrain Glow He was walking in the room with Pt/Ot when I entered. He just had a bowel movement. He offers no complaints.          Past Medical History:    Past Medical History:   Diagnosis Date    Anemia, iron deficiency     Arthritis     Arthritis     Back pain     Constipation     Dementia (HCC)     Difficulty walking     from polio    Dizziness     History of stomach ulcers     Hyperlipidemia     Polio     Snoring     Wears glasses        Past Surgical History:    Past Surgical History:   Procedure Laterality Date    CHOLECYSTECTOMY  1996    COLONOSCOPY  7/19/12    COLONOSCOPY  2/1/2016    ENDOSCOPY, COLON, DIAGNOSTIC      ENDOSCOPY, COLON, DIAGNOSTIC  2/1/2016    FIXATION KYPHOPLASTY  09/01/2017    L1    LEG SURGERY      due to effects from polio (multiple foot/ankle revisions)       Medications Prior to Admission:    Medications Prior to Admission: rivastigmine (EXELON) 3 MG capsule, Take 3 mg by mouth 2 times daily  memantine ER (NAMENDA XR) 28 MG CP24 extended release capsule, Take 28 mg by mouth daily  B Complex Vitamins (B COMPLEX-B12) TABS, Take 1 tablet by mouth daily Last dose 1/26/2016  hydrOXYzine (VISTARIL) 25 MG capsule, Take 25 mg by mouth every 8 hours as needed for Anxiety  magnesium hydroxide (MILK OF MAGNESIA) 400 MG/5ML suspension, Take 30 mLs by mouth daily as needed for Constipation  loperamide (IMODIUM) 2 MG capsule, Take 2 mg by mouth 4 times daily as needed for Diarrhea  aluminum & magnesium hydroxide-simethicone (MAALOX) 582-707-26 MG/5ML SUSP suspension, Take 5 mLs by mouth 4 times daily as needed for Indigestion  Dextromethorphan-guaiFENesin  MG/5ML SYRP, Take 5 mLs by mouth every 4 hours as needed for Cough  levETIRAcetam (KEPPRA) 100 MG/ML solution, Take 5 mLs by mouth 2 times daily for 5 days  loratadine (CLARITIN) 10 MG capsule, Take 10 mg by mouth daily as needed  [DISCONTINUED] QUEtiapine (SEROQUEL) 300 MG tablet, Take 300 mg by mouth daily  [DISCONTINUED] donepezil (ARICEPT) 5 MG tablet, Take 5 mg by mouth nightly  [DISCONTINUED] fluocinonide (LIDEX) 0.05 % cream, Apply topically daily  [DISCONTINUED] NONFORMULARY, ferrilicet  [DISCONTINUED] clobetasol (TEMOVATE) 0.05 % cream, Apply topically 2 times daily as needed Apply topically 2 times daily. Allergies: Other and Shellfish-derived products    Social History:    reports that he has quit smoking. He has never used smokeless tobacco. He reports current alcohol use. He reports that he does not use drugs. Family History:   family history includes COPD in his father; Cancer in his mother and sister; Other in his brother and father. REVIEW OF SYSTEMS:  As above in the HPI, otherwise negative    PHYSICAL EXAM:    Vitals:  BP (!) 102/33 Comment: pt not sitting still   Pulse 81   Temp 98.6 °F (37 °C) (Axillary)   Resp 18   Ht 5' 9\" (1.753 m)   Wt 166 lb 3.2 oz (75.4 kg)   SpO2 99%   BMI 24.54 kg/m²     General:  Awake, alert, oriented to self. Frail appearing. HEENT:  Normocephalic, atraumatic. Pupils equal, round, reactive to light. No scleral icterus. No conjunctival injection. Normal lips, teeth, and gums. No nasal discharge. Neck:  Supple  Heart:  RRR, no murmurs, gallops, rubs  Lungs:  CTA bilaterally, bilat symmetrical expansion, no wheeze, rales, or rhonchi  Abdomen:   Bowel sounds present, soft, nontender, no masses, no organomegaly, no peritoneal signs  Extremities:  No clubbing, cyanosis, or edema  Skin: Results   Component Value Date    TROPONINI <0.01 10/21/2019    TROPONINI <0.01 08/08/2015    TROPONINI <0.01 06/09/2015     U/A:    Lab Results   Component Value Date    COLORU DARK YELLOW 08/03/2020    PROTEINU TRACE 08/03/2020    PHUR 7.0 08/03/2020    WBCUA 0-1 08/03/2020    WBCUA NONE 03/29/2012    RBCUA 5-10 08/03/2020    RBCUA NONE 03/29/2012    BACTERIA RARE 08/03/2020    CLARITYU Clear 08/03/2020    SPECGRAV 1.015 08/03/2020    LEUKOCYTESUR Negative 08/03/2020    UROBILINOGEN 1.0 08/03/2020    BILIRUBINUR SMALL 08/03/2020    BILIRUBINUR NEGATIVE 03/29/2012    BLOODU TRACE-INTACT 08/03/2020    GLUCOSEU Negative 08/03/2020    GLUCOSEU NEGATIVE 03/29/2012     HgBA1c:  No results found for: LABA1C  FLP:  No results found for: TRIG, HDL, LDLCALC, LDLDIRECT, LABVLDL  TSH:    Lab Results   Component Value Date    TSH 1.210 08/09/2015       ASSESSMENT:      Patient Active Problem List   Diagnosis    History of poliomyelitis    Pneumonia    Hyperlipidemia LDL goal <100    Chronic systolic congestive heart failure (HCC)    Dementia (HCC)    Seizure disorder (HCC)         PLAN:    Stable. Treating for acute bacterial pneumonia. Continue aggressive lipid therapy  Echo from 8/9/2015 reviewed. EF of 50%. Euvolemic. Stable. Continue anti-epileptics.   Pt/Ot evaluations for discharge planning    Marcia Kaur MD  10:30 AM  8/4/2020

## 2020-08-04 NOTE — PROGRESS NOTES
Attempted to reach Dr. Quirino Castleman regarding the patient not urinating. Pt was bladder scanned and found to have 311ml.    Kieran Mesa

## 2020-08-04 NOTE — PROGRESS NOTES
Physician Progress Note      PATIENT:               Danette Nair  CSN #:                  [id number]  :                       1940  ADMIT DATE:       8/3/2020 1:28 PM  [address] DATE:  RESPONDING  PROVIDER #:        Chavez Ibrahim MD          QUERY TEXT:    Pt admitted with Pneumonia. Pt noted to have Speech eval showing, \". .. Pharyngeal dysphagia-overt s/s of aspiration observed with continuous sips of   thin liquid per straw only. .. \". If possible, please document in the progress   notes and discharge summary if you are evaluating and/or treating any of the   following: The medical record reflects the following:  Risk Factors: advanced age, dementia  Clinical Indicators: per Speech eval, \". .. Pharyngeal dysphagia-overt s/s of   aspiration observed with continuous sips of thin liquid per straw only. Marnell Dallas Marnell Dallas Marnell Dallas Due   to pt cognitive status, silent aspiration is possible. Marnell Dallas Marnell Dallas \"; CXR=Bilateral   infiltrates likely multifocal pneumonia; procal 0.07; WBC 5.3; Treatment: IV Rocephin, IV Doxy->changed to Levaquin PO; speech recommendation   of  Regular consistency solids with small sips thin liquids-NO STRAWS  Options provided:  -- Bacterial pneumonia  -- Aspiration pneumonia  -- Other - I will add my own diagnosis  -- Disagree - Not applicable / Not valid  -- Disagree - Clinically unable to determine / Unknown  -- Refer to Clinical Documentation Reviewer    PROVIDER RESPONSE TEXT:    This patient has bacterial pneumonia.     Query created by: Ry Rodriguez on 2020 10:55 AM      Electronically signed by:  Chavez Ibrahim MD 2020 2:49 PM

## 2020-08-04 NOTE — CARE COORDINATION
8/4/2020  Social Work Discharge Planning:Pt to go to [facility]. N-17 generated, hens completed and transport form is in chart.  Electronically signed by LORNA Pretty on 8/4/2020 at 1:01 PM

## 2020-08-04 NOTE — PLAN OF CARE
Problem: Skin Integrity:  Goal: Will show no infection signs and symptoms  Description: Will show no infection signs and symptoms  Outcome: Completed  Goal: Absence of new skin breakdown  Description: Absence of new skin breakdown  Outcome: Completed     Problem: Falls - Risk of:  Goal: Will remain free from falls  Description: Will remain free from falls  Outcome: Completed  Goal: Absence of physical injury  Description: Absence of physical injury  Outcome: Completed

## 2020-08-05 LAB
ALBUMIN SERPL-MCNC: 3.1 G/DL (ref 3.5–5.2)
ALP BLD-CCNC: 56 U/L (ref 40–129)
ALT SERPL-CCNC: 18 U/L (ref 0–40)
ANION GAP SERPL CALCULATED.3IONS-SCNC: 15 MMOL/L (ref 7–16)
APTT: 32.4 SEC (ref 24.5–35.1)
AST SERPL-CCNC: 18 U/L (ref 0–39)
BILIRUB SERPL-MCNC: <0.2 MG/DL (ref 0–1.2)
BUN BLDV-MCNC: 31 MG/DL (ref 8–23)
BURR CELLS: ABNORMAL
CALCIUM SERPL-MCNC: 9.2 MG/DL (ref 8.6–10.2)
CHLORIDE BLD-SCNC: 97 MMOL/L (ref 98–107)
CO2: 19 MMOL/L (ref 22–29)
CREAT SERPL-MCNC: 1.2 MG/DL (ref 0.7–1.2)
D DIMER: 745 NG/ML DDU
FERRITIN: 708 NG/ML
FIBRINOGEN: 696 MG/DL (ref 225–540)
GFR AFRICAN AMERICAN: >60
GFR NON-AFRICAN AMERICAN: 58 ML/MIN/1.73
GLUCOSE BLD-MCNC: 269 MG/DL (ref 74–99)
HCT VFR BLD CALC: 50.6 % (ref 37–54)
HEMOGLOBIN: 17.2 G/DL (ref 12.5–16.5)
INR BLD: 1.2
LACTATE DEHYDROGENASE: 219 U/L (ref 135–225)
MCH RBC QN AUTO: 30.9 PG (ref 26–35)
MCHC RBC AUTO-ENTMCNC: 34 % (ref 32–34.5)
MCV RBC AUTO: 91 FL (ref 80–99.9)
PDW BLD-RTO: 13.5 FL (ref 11.5–15)
PLATELET # BLD: 236 E9/L (ref 130–450)
PMV BLD AUTO: 9.1 FL (ref 7–12)
POIKILOCYTES: ABNORMAL
POLYCHROMASIA: ABNORMAL
POTASSIUM REFLEX MAGNESIUM: 4.5 MMOL/L (ref 3.5–5)
PROTHROMBIN TIME: 13.9 SEC (ref 9.3–12.4)
RBC # BLD: 5.56 E12/L (ref 3.8–5.8)
SODIUM BLD-SCNC: 131 MMOL/L (ref 132–146)
TOTAL PROTEIN: 6.9 G/DL (ref 6.4–8.3)
URINE CULTURE, ROUTINE: NORMAL
WBC # BLD: 10.7 E9/L (ref 4.5–11.5)

## 2020-08-05 NOTE — ED PROVIDER NOTES
DAVID Zambrano is a 79 y.o. male with a PMHx significant for hyperlipidemia, peptic ulcer disease, chronic back pain, arthritis and dementia who presents with reports of fever and altered mental status at his nursing home. EMS reports that the patient is nonverbal, but they are not sure if this is a new issue. EMS states they did not give the patient anything in route, and they are not sure if anything was given by the nursing home prior to their assuming care of the patient. On arrival, the patient is unable to provide any significant past medical history secondary to his dementia. Based on chart review, it appears the patient may be at his baseline mental status. .     The patient is currently taking no blood thinners. Tobacco Hx:   reports that he has quit smoking. He has never used smokeless tobacco.    Alcohol Hx:   reports current alcohol use. Illicit Drug Hx:    The history is provided by EMS and obtained via chart review. The patient is unable to provide any meaningful history 2/2 their condition. Last Tetanus (if applicable): N/A    Review of Systems   Unable to perform ROS: Dementia        Physical Exam  Vitals signs and nursing note reviewed. Constitutional:       General: He is awake. Comments: On arrival, the patient is awake and alert, but significantly confused, uncooperative and combative. He does not appear to be in any acute distress. HENT:      Head: Normocephalic and atraumatic. Right Ear: External ear normal.      Left Ear: External ear normal.      Nose: Nose normal.      Mouth/Throat:      Mouth: Mucous membranes are moist.      Pharynx: Oropharynx is clear. Eyes:      General: No scleral icterus. Right eye: No discharge. Left eye: No discharge. Extraocular Movements: Extraocular movements intact. Conjunctiva/sclera: Conjunctivae normal.      Pupils: Pupils are equal, round, and reactive to light.    Neck:      Musculoskeletal: Normal range of motion and neck supple. No neck rigidity or muscular tenderness. Cardiovascular:      Rate and Rhythm: Normal rate and regular rhythm. Heart sounds: Normal heart sounds. No murmur. No friction rub. No gallop. Comments: Upper extremity and lower extremity distal pulses intact bilaterally +2/4  Pulmonary:      Effort: Pulmonary effort is normal. No respiratory distress. Breath sounds: Rales (Bibasilar crackles noted) present. No wheezing or rhonchi. Comments: Decreased air movement noted throughout. Symmetrical lung expansion. Abdominal:      General: Bowel sounds are normal. There is no distension. Palpations: Abdomen is soft. Tenderness: There is no abdominal tenderness. There is no guarding. Musculoskeletal: Normal range of motion. General: No tenderness or deformity. Right lower leg: No edema. Left lower leg: No edema. Lymphadenopathy:      Cervical: No cervical adenopathy. Skin:     General: Skin is warm and dry. Capillary Refill: Capillary refill takes less than 2 seconds. Findings: No erythema or rash. Neurological:      Mental Status: He is disoriented. GCS: GCS eye subscore is 4. GCS verbal subscore is 2. GCS motor subscore is 4. Sensory: Sensation is intact. Motor: Motor function is intact. Comments: A comprehensive neuro exam was unable to be completed secondary to the patient's condition. He is disoriented, which may be his baseline.        --------------------------------------------- PAST HISTORY ---------------------------------------------  Past Medical History:  has a past medical history of Anemia, iron deficiency, Arthritis, Arthritis, Back pain, Constipation, Dementia (HCC), Difficulty walking, Dizziness, History of stomach ulcers, Hyperlipidemia, Polio, Snoring, and Wears glasses. Past Surgical History:  has a past surgical history that includes Leg Surgery;  Cholecystectomy (1996); Colonoscopy (7/19/12); Colonoscopy (2/1/2016); Endoscopy, colon, diagnostic; Endoscopy, colon, diagnostic (2/1/2016); and Fixation Kyphoplasty (09/01/2017). Social History:  reports that he has quit smoking. He has never used smokeless tobacco. He reports current alcohol use. He reports that he does not use drugs. Family History: family history includes COPD in his father; Cancer in his mother and sister; Other in his brother and father. Home Meds: No medications prior to admission. The patients home medications have been reviewed. Allergies: Other and Shellfish-derived products    ------------------------- NURSING NOTES AND VITALS REVIEWED ---------------------------  Date / Time Roomed:  8/3/2020  1:28 PM  ED Bed Assignment:  [id number]    The nursing notes within the ED encounter and vital signs as below have been reviewed. BP (!) 102/33 Comment: pt not sitting still   Pulse 81   Temp 98.6 °F (37 °C) (Axillary)   Resp 18   Ht 5' 9\" (1.753 m)   Wt 166 lb 3.2 oz (75.4 kg)   SpO2 99%   BMI 24.54 kg/m²   -------------------------------------------------- RESULTS / INTERVENTIONS -------------------------------------------------  All laboratory and radiology tests have been reviewed by this physician.     LABS:  Results for orders placed or performed during the hospital encounter of 08/03/20   Culture, Urine    Specimen: Urine, clean catch   Result Value Ref Range    Urine Culture, Routine Growth not present, incubation continues    Culture, Blood 1    Specimen: Blood   Result Value Ref Range    Blood Culture, Routine 24 Hours no growth    Culture, Blood 2    Specimen: Blood   Result Value Ref Range    Culture, Blood 2 24 Hours no growth    CBC Auto Differential   Result Value Ref Range    WBC 5.3 4.5 - 11.5 E9/L    RBC 5.02 3.80 - 5.80 E12/L    Hemoglobin 15.7 12.5 - 16.5 g/dL    Hematocrit 47.5 37.0 - 54.0 %    MCV 94.6 80.0 - 99.9 fL    MCH 31.3 26.0 - 35.0 pg    MCHC 33.1 32.0 - 34.5 % Urinalysis   Result Value Ref Range    WBC, UA 0-1 0 - 5 /HPF    RBC, UA 5-10 (A) 0 - 2 /HPF    Bacteria, UA RARE (A) None Seen /HPF   COVID-19   Result Value Ref Range    SARS-CoV-2, NAAT Not Detected Not Detected   Procalcitonin   Result Value Ref Range    Procalcitonin 0.06 0.00 - 0.08 ng/mL   CBC Auto Differential   Result Value Ref Range    WBC 4.8 4.5 - 11.5 E9/L    RBC 4.69 3.80 - 5.80 E12/L    Hemoglobin 14.5 12.5 - 16.5 g/dL    Hematocrit 43.8 37.0 - 54.0 %    MCV 93.4 80.0 - 99.9 fL    MCH 30.9 26.0 - 35.0 pg    MCHC 33.1 32.0 - 34.5 %    RDW 13.8 11.5 - 15.0 fL    Platelets 343 019 - 121 E9/L    MPV 10.6 7.0 - 12.0 fL    Neutrophils % 56.5 43.0 - 80.0 %    Immature Granulocytes % 0.4 0.0 - 5.0 %    Lymphocytes % 27.7 20.0 - 42.0 %    Monocytes % 14.6 (H) 2.0 - 12.0 %    Eosinophils % 0.4 0.0 - 6.0 %    Basophils % 0.4 0.0 - 2.0 %    Neutrophils Absolute 2.72 1.80 - 7.30 E9/L    Immature Granulocytes # 0.02 E9/L    Lymphocytes Absolute 1.33 (L) 1.50 - 4.00 E9/L    Monocytes Absolute 0.70 0.10 - 0.95 E9/L    Eosinophils Absolute 0.02 (L) 0.05 - 0.50 E9/L    Basophils Absolute 0.02 0.00 - 0.20 E9/L   Comprehensive Metabolic Panel w/ Reflex to MG   Result Value Ref Range    Sodium 139 132 - 146 mmol/L    Potassium reflex Magnesium 4.0 3.5 - 5.0 mmol/L    Chloride 98 98 - 107 mmol/L    CO2 25 22 - 29 mmol/L    Anion Gap 16 7 - 16 mmol/L    Glucose 87 74 - 99 mg/dL    BUN 13 8 - 23 mg/dL    CREATININE 0.4 (L) 0.7 - 1.2 mg/dL    GFR Non-African American >60 >=60 mL/min/1.73    GFR African American >60     Calcium 8.4 (L) 8.6 - 10.2 mg/dL    Total Protein 6.5 6.4 - 8.3 g/dL    Alb 3.4 (L) 3.5 - 5.2 g/dL    Total Bilirubin 0.3 0.0 - 1.2 mg/dL    Alkaline Phosphatase 56 40 - 129 U/L    ALT 18 0 - 40 U/L    AST 39 0 - 39 U/L   Protime-INR   Result Value Ref Range    Protime 12.4 9.3 - 12.4 sec    INR 1.0    APTT   Result Value Ref Range    aPTT 37.4 (H) 24.5 - 35.1 sec   Fibrinogen   Result Value Ref Range Fibrinogen >700 (H) 225 - 540 mg/dL   D-Dimer, Quantitative   Result Value Ref Range    D-Dimer, Quant 285 ng/mL DDU   Procalcitonin   Result Value Ref Range    Procalcitonin 0.07 0.00 - 0.08 ng/mL   Brain Natriuretic Peptide   Result Value Ref Range    Pro- 0 - 450 pg/mL   Lactic acid, plasma   Result Value Ref Range    Lactic Acid 1.0 0.5 - 2.2 mmol/L   POCT Glucose   Result Value Ref Range    Glucose 108 mg/dL    QC OK? ok    POCT Glucose   Result Value Ref Range    Meter Glucose 107 (H) 74 - 99 mg/dL   EKG 12 Lead   Result Value Ref Range    Ventricular Rate 106 BPM    Atrial Rate 107 BPM    P-R Interval 118 ms    QRS Duration 126 ms    Q-T Interval 364 ms    QTc Calculation (Bazett) 483 ms    P Axis 27 degrees    R Axis 90 degrees    T Axis 56 degrees       RADIOLOGY: Interpreted by Radiologist unless otherwise noted. XR CHEST PORTABLE   Final Result      Bilateral infiltrates likely multifocal pneumonia. Follow-up   recommended to exclude malignancy. EKG: As interpreted by this ER physician. Rate: 106 bpm  Rhythm: Sinus with RBBB  Axis: normal  ST Segments: no acute change  T-Waves: no acute change  Interpretation: Sinus tachycardia with RBBB and significant baseline artifact  Comparison: There is new tachycardia, otherwise stable as compared to patient's most recent EKG on 10/22/2019    Oxygen Saturation Interpretation: Normal    Meds Given:  Medications   0.9 % sodium chloride bolus (0 mLs Intravenous Stopped 8/3/20 1528)   cefTRIAXone (ROCEPHIN) 1 g in sterile water 10 mL IV syringe (0 g Intravenous Stopped 8/3/20 1755)   doxycycline (VIBRAMYCIN) 100 mg in dextrose 5 % 100 mL IVPB (0 mg Intravenous Stopped 8/3/20 2151)   LORazepam (ATIVAN) injection 0.5 mg (0.5 mg Intravenous Given 8/3/20 1755)       Procedures:  No procedures performed.     --------------------------------- PROGRESS NOTES / ADDITIONAL PROVIDER NOTES ---------------------------------  Consultations:  As outlined below.    ED Course:         1945: The patient was stable at the time of admission and was without objective evidence of hemodynamic instability. He was seen in the emergency department by myself and the assigned attending physician, Dr. Catrina Tolentino, who agreed with the assessment, plan and decision to admit as laid out herein. The was deemed to be in stable condition at the time of transport. MDM:  Patient presented from his nursing home via EMS with reports of fever and altered mental status. Patient was afebrile on arrival here. All remaining vital signs were within normal limits. Physical exam was as documented above. The patient was noted to be combative with all personnel throughout his time in the department and soft restraints were put in place. A sepsis work-up was initiated. Labs were remarkable for a lactate of 3.4. COVID was negative. Remaining labs were unremarkable. Chest x-ray revealed what appeared to be multifocal pneumonia. The patient was started on Rocephin and doxy. Case was discussed with IM who agreed to admit. The patient was stable at the time of his disposition. This patient's ED course included: a personal history and physicial examination, re-evaluation prior to disposition, multiple bedside re-evaluations, IV medications, cardiac monitoring and continuous pulse oximetry    Diagnosis:  1. Pneumonia due to organism        Disposition:  Patient's disposition: Admit to telemetry  Patient's condition is stable. This patient was seen, examined and treated with Dr. Catrina Tolentino. All aspects of the patient's care were discussed with the attending physician.        Lexie Zaman,   Resident  08/05/20 [id number]

## 2020-08-08 LAB
BLOOD CULTURE, ROUTINE: NORMAL
CULTURE, BLOOD 2: NORMAL

## 2023-01-03 NOTE — PROGRESS NOTES
Nurse-to-nurse given to Debbie Carranza at [facility] Unit Name: [address]                        Unit Phone Number: [phone]